# Patient Record
Sex: MALE | Race: OTHER | HISPANIC OR LATINO
[De-identification: names, ages, dates, MRNs, and addresses within clinical notes are randomized per-mention and may not be internally consistent; named-entity substitution may affect disease eponyms.]

---

## 2018-02-25 ENCOUNTER — TRANSCRIPTION ENCOUNTER (OUTPATIENT)
Age: 48
End: 2018-02-25

## 2019-06-10 ENCOUNTER — EMERGENCY (EMERGENCY)
Facility: HOSPITAL | Age: 49
LOS: 0 days | Discharge: HOME | End: 2019-06-10
Attending: EMERGENCY MEDICINE | Admitting: EMERGENCY MEDICINE
Payer: MEDICAID

## 2019-06-10 VITALS
TEMPERATURE: 99 F | RESPIRATION RATE: 18 BRPM | OXYGEN SATURATION: 99 % | SYSTOLIC BLOOD PRESSURE: 132 MMHG | HEART RATE: 75 BPM | DIASTOLIC BLOOD PRESSURE: 81 MMHG

## 2019-06-10 VITALS — WEIGHT: 179.9 LBS | HEIGHT: 66 IN

## 2019-06-10 DIAGNOSIS — R51 HEADACHE: ICD-10-CM

## 2019-06-10 DIAGNOSIS — E78.5 HYPERLIPIDEMIA, UNSPECIFIED: ICD-10-CM

## 2019-06-10 DIAGNOSIS — R42 DIZZINESS AND GIDDINESS: ICD-10-CM

## 2019-06-10 LAB
ALBUMIN SERPL ELPH-MCNC: 4 G/DL — SIGNIFICANT CHANGE UP (ref 3.5–5.2)
ALP SERPL-CCNC: 87 U/L — SIGNIFICANT CHANGE UP (ref 30–115)
ALT FLD-CCNC: 24 U/L — SIGNIFICANT CHANGE UP (ref 0–41)
ANION GAP SERPL CALC-SCNC: 12 MMOL/L — SIGNIFICANT CHANGE UP (ref 7–14)
AST SERPL-CCNC: 19 U/L — SIGNIFICANT CHANGE UP (ref 0–41)
BASOPHILS # BLD AUTO: 0.02 K/UL — SIGNIFICANT CHANGE UP (ref 0–0.2)
BASOPHILS NFR BLD AUTO: 0.4 % — SIGNIFICANT CHANGE UP (ref 0–1)
BILIRUB SERPL-MCNC: 0.3 MG/DL — SIGNIFICANT CHANGE UP (ref 0.2–1.2)
BUN SERPL-MCNC: 17 MG/DL — SIGNIFICANT CHANGE UP (ref 10–20)
CALCIUM SERPL-MCNC: 9.3 MG/DL — SIGNIFICANT CHANGE UP (ref 8.5–10.1)
CHLORIDE SERPL-SCNC: 103 MMOL/L — SIGNIFICANT CHANGE UP (ref 98–110)
CO2 SERPL-SCNC: 26 MMOL/L — SIGNIFICANT CHANGE UP (ref 17–32)
CREAT SERPL-MCNC: 1.1 MG/DL — SIGNIFICANT CHANGE UP (ref 0.7–1.5)
EOSINOPHIL # BLD AUTO: 0.06 K/UL — SIGNIFICANT CHANGE UP (ref 0–0.7)
EOSINOPHIL NFR BLD AUTO: 1.2 % — SIGNIFICANT CHANGE UP (ref 0–8)
GLUCOSE SERPL-MCNC: 108 MG/DL — HIGH (ref 70–99)
HCT VFR BLD CALC: 44.9 % — SIGNIFICANT CHANGE UP (ref 42–52)
HGB BLD-MCNC: 14.7 G/DL — SIGNIFICANT CHANGE UP (ref 14–18)
IMM GRANULOCYTES NFR BLD AUTO: 0.2 % — SIGNIFICANT CHANGE UP (ref 0.1–0.3)
LYMPHOCYTES # BLD AUTO: 1.77 K/UL — SIGNIFICANT CHANGE UP (ref 1.2–3.4)
LYMPHOCYTES # BLD AUTO: 34.3 % — SIGNIFICANT CHANGE UP (ref 20.5–51.1)
MAGNESIUM SERPL-MCNC: 1.9 MG/DL — SIGNIFICANT CHANGE UP (ref 1.8–2.4)
MCHC RBC-ENTMCNC: 29.9 PG — SIGNIFICANT CHANGE UP (ref 27–31)
MCHC RBC-ENTMCNC: 32.7 G/DL — SIGNIFICANT CHANGE UP (ref 32–37)
MCV RBC AUTO: 91.3 FL — SIGNIFICANT CHANGE UP (ref 80–94)
MONOCYTES # BLD AUTO: 0.41 K/UL — SIGNIFICANT CHANGE UP (ref 0.1–0.6)
MONOCYTES NFR BLD AUTO: 7.9 % — SIGNIFICANT CHANGE UP (ref 1.7–9.3)
NEUTROPHILS # BLD AUTO: 2.89 K/UL — SIGNIFICANT CHANGE UP (ref 1.4–6.5)
NEUTROPHILS NFR BLD AUTO: 56 % — SIGNIFICANT CHANGE UP (ref 42.2–75.2)
NRBC # BLD: 0 /100 WBCS — SIGNIFICANT CHANGE UP (ref 0–0)
PLATELET # BLD AUTO: 181 K/UL — SIGNIFICANT CHANGE UP (ref 130–400)
POTASSIUM SERPL-MCNC: 4.2 MMOL/L — SIGNIFICANT CHANGE UP (ref 3.5–5)
POTASSIUM SERPL-SCNC: 4.2 MMOL/L — SIGNIFICANT CHANGE UP (ref 3.5–5)
PROT SERPL-MCNC: 6.8 G/DL — SIGNIFICANT CHANGE UP (ref 6–8)
RBC # BLD: 4.92 M/UL — SIGNIFICANT CHANGE UP (ref 4.7–6.1)
RBC # FLD: 12.9 % — SIGNIFICANT CHANGE UP (ref 11.5–14.5)
SODIUM SERPL-SCNC: 141 MMOL/L — SIGNIFICANT CHANGE UP (ref 135–146)
TROPONIN T SERPL-MCNC: <0.01 NG/ML — SIGNIFICANT CHANGE UP
WBC # BLD: 5.16 K/UL — SIGNIFICANT CHANGE UP (ref 4.8–10.8)
WBC # FLD AUTO: 5.16 K/UL — SIGNIFICANT CHANGE UP (ref 4.8–10.8)

## 2019-06-10 PROCEDURE — 93010 ELECTROCARDIOGRAM REPORT: CPT

## 2019-06-10 PROCEDURE — 99285 EMERGENCY DEPT VISIT HI MDM: CPT

## 2019-06-10 PROCEDURE — 70450 CT HEAD/BRAIN W/O DYE: CPT | Mod: 26

## 2019-06-10 PROCEDURE — 71045 X-RAY EXAM CHEST 1 VIEW: CPT | Mod: 26

## 2019-06-10 RX ORDER — MECLIZINE HCL 12.5 MG
50 TABLET ORAL ONCE
Refills: 0 | Status: COMPLETED | OUTPATIENT
Start: 2019-06-10 | End: 2019-06-10

## 2019-06-10 RX ORDER — SODIUM CHLORIDE 9 MG/ML
1000 INJECTION, SOLUTION INTRAVENOUS ONCE
Refills: 0 | Status: COMPLETED | OUTPATIENT
Start: 2019-06-10 | End: 2019-06-10

## 2019-06-10 RX ORDER — MECLIZINE HCL 12.5 MG
1 TABLET ORAL
Qty: 21 | Refills: 0
Start: 2019-06-10 | End: 2019-06-16

## 2019-06-10 RX ADMIN — Medication 50 MILLIGRAM(S): at 18:44

## 2019-06-10 RX ADMIN — SODIUM CHLORIDE 1000 MILLILITER(S): 9 INJECTION, SOLUTION INTRAVENOUS at 18:44

## 2019-06-10 NOTE — ED ADULT NURSE NOTE - NSIMPLEMENTINTERV_GEN_ALL_ED
Implemented All Universal Safety Interventions:  [place] to call system. Call bell, personal items and telephone within reach. Instruct patient to call for assistance. Room bathroom lighting operational. Non-slip footwear when patient is off stretcher. Physically safe environment: no spills, clutter or unnecessary equipment. Stretcher in lowest position, wheels locked, appropriate side rails in place.

## 2019-06-10 NOTE — ED PROVIDER NOTE - NSFOLLOWUPCLINICS_GEN_ALL_ED_FT
[facility]  Neurology  [address] [place] [address], [facility] [address]  [place] [address]  Phone: [phone]  Fax:   Follow Up Time:

## 2019-06-10 NOTE — ED PROVIDER NOTE - CLINICAL SUMMARY MEDICAL DECISION MAKING FREE TEXT BOX
Patient presented with intermittent dizziness and headache x 1 week. Fully neuro intact, HD stable, afebrile, well appearing. Obtained labs which were grossly unremarkable. Unlikely cardiac etiology based on history but EKG non-ischemic, trop negative (1 week of symptoms). CT head negative as well. After tx in ED with meclizine patient felt better, ambulatory, tolerates PO. Will give neuro outpatient follow up and patient agrees to call for appointment. Agrees to return to ED for any new or worsening symptoms.

## 2019-06-10 NOTE — ED PROVIDER NOTE - PROGRESS NOTE DETAILS
Patient seen and evaluated by me. Labs/imaging ordered. Fully neuro intact. Started on 1L IVF, given 50mg meclizine PO. Will re-eval pending work up. Labs/imaging back and results discussed with patient. Feeling better. Agrees to follow up with neurology as provided in DC instructions. Ambulatory, tolerates PO.

## 2019-06-10 NOTE — ED PROVIDER NOTE - OBJECTIVE STATEMENT
48 year old male, pmhx HLD, presenting with a 1 week history of worsening dizziness described as a mix between lightheadedness and vertigo, as well as intermittent headaches as well. Headaches are diffuse, non-radiating, no palliative or provocative factors, intermittent, 4/10 at their worst. States he has never had this before. Currently does not have a headache. Otherwise denies fevers, vision changes, weakness/numbness, confusion, URI symptoms, neck pain, chest pain, back pain, dyspnea, cough, palpitations, nausea, vomiting, abdominal pain, diarrhea, constipation, blood in stool/dark stools, urinary symptoms, penile discharge/testicular pain, leg swelling, rash, recent travel or sick contacts.

## 2020-12-18 ENCOUNTER — OUTPATIENT (OUTPATIENT)
Dept: OUTPATIENT SERVICES | Facility: HOSPITAL | Age: 50
LOS: 1 days | Discharge: HOME | End: 2020-12-18

## 2020-12-18 DIAGNOSIS — Z11.59 ENCOUNTER FOR SCREENING FOR OTHER VIRAL DISEASES: ICD-10-CM

## 2020-12-18 PROBLEM — E78.5 HYPERLIPIDEMIA, UNSPECIFIED: Chronic | Status: ACTIVE | Noted: 2019-06-10

## 2020-12-21 ENCOUNTER — OUTPATIENT (OUTPATIENT)
Dept: OUTPATIENT SERVICES | Facility: HOSPITAL | Age: 50
LOS: 1 days | Discharge: HOME | End: 2020-12-21
Payer: MEDICARE

## 2020-12-21 DIAGNOSIS — R07.9 CHEST PAIN, UNSPECIFIED: ICD-10-CM

## 2020-12-21 PROCEDURE — 93306 TTE W/DOPPLER COMPLETE: CPT | Mod: 26

## 2022-02-24 ENCOUNTER — EMERGENCY (EMERGENCY)
Facility: HOSPITAL | Age: 52
LOS: 0 days | Discharge: HOME | End: 2022-02-25
Attending: EMERGENCY MEDICINE | Admitting: EMERGENCY MEDICINE
Payer: MEDICAID

## 2022-02-24 VITALS
OXYGEN SATURATION: 97 % | TEMPERATURE: 98 F | RESPIRATION RATE: 20 BRPM | HEIGHT: 66 IN | HEART RATE: 94 BPM | SYSTOLIC BLOOD PRESSURE: 122 MMHG | DIASTOLIC BLOOD PRESSURE: 58 MMHG

## 2022-02-24 DIAGNOSIS — R07.9 CHEST PAIN, UNSPECIFIED: ICD-10-CM

## 2022-02-24 DIAGNOSIS — E78.5 HYPERLIPIDEMIA, UNSPECIFIED: ICD-10-CM

## 2022-02-24 DIAGNOSIS — K27.9 PEPTIC ULCER, SITE UNSPECIFIED, UNSPECIFIED AS ACUTE OR CHRONIC, WITHOUT HEMORRHAGE OR PERFORATION: ICD-10-CM

## 2022-02-24 DIAGNOSIS — R07.2 PRECORDIAL PAIN: ICD-10-CM

## 2022-02-24 DIAGNOSIS — R07.89 OTHER CHEST PAIN: ICD-10-CM

## 2022-02-24 DIAGNOSIS — Z20.822 CONTACT WITH AND (SUSPECTED) EXPOSURE TO COVID-19: ICD-10-CM

## 2022-02-24 LAB
ALBUMIN SERPL ELPH-MCNC: 4.2 G/DL — SIGNIFICANT CHANGE UP (ref 3.5–5.2)
ALP SERPL-CCNC: 64 U/L — SIGNIFICANT CHANGE UP (ref 30–115)
ALT FLD-CCNC: 30 U/L — SIGNIFICANT CHANGE UP (ref 0–41)
ANION GAP SERPL CALC-SCNC: 9 MMOL/L — SIGNIFICANT CHANGE UP (ref 7–14)
AST SERPL-CCNC: 23 U/L — SIGNIFICANT CHANGE UP (ref 0–41)
BASOPHILS # BLD AUTO: 0.05 K/UL — SIGNIFICANT CHANGE UP (ref 0–0.2)
BASOPHILS NFR BLD AUTO: 1.1 % — HIGH (ref 0–1)
BILIRUB SERPL-MCNC: 0.6 MG/DL — SIGNIFICANT CHANGE UP (ref 0.2–1.2)
BUN SERPL-MCNC: 19 MG/DL — SIGNIFICANT CHANGE UP (ref 10–20)
CALCIUM SERPL-MCNC: 9 MG/DL — SIGNIFICANT CHANGE UP (ref 8.5–10.1)
CHLORIDE SERPL-SCNC: 103 MMOL/L — SIGNIFICANT CHANGE UP (ref 98–110)
CO2 SERPL-SCNC: 26 MMOL/L — SIGNIFICANT CHANGE UP (ref 17–32)
CREAT SERPL-MCNC: 1.2 MG/DL — SIGNIFICANT CHANGE UP (ref 0.7–1.5)
EOSINOPHIL # BLD AUTO: 0.08 K/UL — SIGNIFICANT CHANGE UP (ref 0–0.7)
EOSINOPHIL NFR BLD AUTO: 1.7 % — SIGNIFICANT CHANGE UP (ref 0–8)
GLUCOSE SERPL-MCNC: 102 MG/DL — HIGH (ref 70–99)
HCT VFR BLD CALC: 44.1 % — SIGNIFICANT CHANGE UP (ref 42–52)
HGB BLD-MCNC: 14.8 G/DL — SIGNIFICANT CHANGE UP (ref 14–18)
IMM GRANULOCYTES NFR BLD AUTO: 0.2 % — SIGNIFICANT CHANGE UP (ref 0.1–0.3)
LYMPHOCYTES # BLD AUTO: 1.75 K/UL — SIGNIFICANT CHANGE UP (ref 1.2–3.4)
LYMPHOCYTES # BLD AUTO: 37.1 % — SIGNIFICANT CHANGE UP (ref 20.5–51.1)
MCHC RBC-ENTMCNC: 29.7 PG — SIGNIFICANT CHANGE UP (ref 27–31)
MCHC RBC-ENTMCNC: 33.6 G/DL — SIGNIFICANT CHANGE UP (ref 32–37)
MCV RBC AUTO: 88.6 FL — SIGNIFICANT CHANGE UP (ref 80–94)
MONOCYTES # BLD AUTO: 0.39 K/UL — SIGNIFICANT CHANGE UP (ref 0.1–0.6)
MONOCYTES NFR BLD AUTO: 8.3 % — SIGNIFICANT CHANGE UP (ref 1.7–9.3)
NEUTROPHILS # BLD AUTO: 2.44 K/UL — SIGNIFICANT CHANGE UP (ref 1.4–6.5)
NEUTROPHILS NFR BLD AUTO: 51.6 % — SIGNIFICANT CHANGE UP (ref 42.2–75.2)
NRBC # BLD: 0 /100 WBCS — SIGNIFICANT CHANGE UP (ref 0–0)
NT-PROBNP SERPL-SCNC: 38 PG/ML — SIGNIFICANT CHANGE UP (ref 0–300)
PLATELET # BLD AUTO: 167 K/UL — SIGNIFICANT CHANGE UP (ref 130–400)
POTASSIUM SERPL-MCNC: 4.1 MMOL/L — SIGNIFICANT CHANGE UP (ref 3.5–5)
POTASSIUM SERPL-SCNC: 4.1 MMOL/L — SIGNIFICANT CHANGE UP (ref 3.5–5)
PROT SERPL-MCNC: 6.6 G/DL — SIGNIFICANT CHANGE UP (ref 6–8)
RBC # BLD: 4.98 M/UL — SIGNIFICANT CHANGE UP (ref 4.7–6.1)
RBC # FLD: 12.2 % — SIGNIFICANT CHANGE UP (ref 11.5–14.5)
SODIUM SERPL-SCNC: 138 MMOL/L — SIGNIFICANT CHANGE UP (ref 135–146)
TROPONIN T SERPL-MCNC: <0.01 NG/ML — SIGNIFICANT CHANGE UP
TROPONIN T SERPL-MCNC: <0.01 NG/ML — SIGNIFICANT CHANGE UP
WBC # BLD: 4.72 K/UL — LOW (ref 4.8–10.8)
WBC # FLD AUTO: 4.72 K/UL — LOW (ref 4.8–10.8)

## 2022-02-24 PROCEDURE — 71045 X-RAY EXAM CHEST 1 VIEW: CPT | Mod: 26

## 2022-02-24 PROCEDURE — 99218: CPT

## 2022-02-24 RX ORDER — FAMOTIDINE 10 MG/ML
20 INJECTION INTRAVENOUS ONCE
Refills: 0 | Status: COMPLETED | OUTPATIENT
Start: 2022-02-24 | End: 2022-02-24

## 2022-02-24 RX ORDER — FUROSEMIDE 40 MG
40 TABLET ORAL ONCE
Refills: 0 | Status: COMPLETED | OUTPATIENT
Start: 2022-02-24 | End: 2022-02-24

## 2022-02-24 RX ORDER — ASPIRIN/CALCIUM CARB/MAGNESIUM 324 MG
162 TABLET ORAL ONCE
Refills: 0 | Status: COMPLETED | OUTPATIENT
Start: 2022-02-24 | End: 2022-02-24

## 2022-02-24 RX ADMIN — Medication 162 MILLIGRAM(S): at 20:12

## 2022-02-24 RX ADMIN — FAMOTIDINE 20 MILLIGRAM(S): 10 INJECTION INTRAVENOUS at 22:27

## 2022-02-24 NOTE — ED CDU PROVIDER INITIAL DAY NOTE - MEDICAL DECISION MAKING DETAILS
51-year-old male was placed in observation for evaluation of chest pain.  Patient had negative troponin x2.  EKG did not demonstrate acute ischemia.  Chest x-ray normal.  Patient pending CCTA.

## 2022-02-24 NOTE — ED PROVIDER NOTE - OBJECTIVE STATEMENT
51y M pmh HLD, PUD presenting with substernal chest pain xfew days. Described as chest pressure. Comes and goes. No f/c/n/v. Nonsmoker. No difficulty breathing. Pt supposed to be on pantoprazole but says he self d/shanda it last week as it was causing headaches and diarrhea. No leg swelling. Has never seen a cardiologist or had a workup. No radiation of pain, but pain is severe. No trauma/overlying skin lesions.

## 2022-02-24 NOTE — ED CDU PROVIDER INITIAL DAY NOTE - ATTENDING CONTRIBUTION TO CARE
51-year-old male with past medical history of hyperlipidemia was placed in observation for chest pain.  No shortness of breath no palpitations.  Patient states he is doing well since placed observation.  No acute events.    Const: Well nourished, well developed, appears stated age  Eyes: PERRL, no conjunctival injection  HENT:  Neck supple without meningismus   CV: RRR, Warm, well-perfused extremities  RESP: CTA B/L, no tachypnea   GI: soft, non-tender, non-distended  MSK: No gross deformities appreciated  Skin: Warm, dry. No rashes  Neuro: Alert, CNs II-XII grossly intact. Sensation and motor function of extremities grossly intact.  Psych: Appropriate mood and affect.    pending CCTA

## 2022-02-24 NOTE — ED PROVIDER NOTE - ATTENDING CONTRIBUTION TO CARE
I personally evaluated the patient. I reviewed the Resident’s or Physician Assistant’s note (as assigned above), and agree with the findings and plan except as documented in my note.  Pt with hx of HTN presents with 1 day of non radiating left sided chest pressure. No associated SOB, palpitations, lightheadedness, LOC. VS reviewed, pt non-toxic appearing, NAD. Head ncat, MMM, neck supple, normal ROM, normal s1s2 without any murmurs, Lungs CTAB with normal work of breathing. abd +BS, s/nd/nt, extremities wnl, neuro exam grossly normal. No acute skin rashes. Plan is ekg, labs, monitor, imaging and dispo accordingly.

## 2022-02-25 VITALS
HEART RATE: 62 BPM | OXYGEN SATURATION: 98 % | SYSTOLIC BLOOD PRESSURE: 112 MMHG | DIASTOLIC BLOOD PRESSURE: 64 MMHG | RESPIRATION RATE: 18 BRPM

## 2022-02-25 LAB — SARS-COV-2 RNA SPEC QL NAA+PROBE: SIGNIFICANT CHANGE UP

## 2022-02-25 PROCEDURE — 75574 CT ANGIO HRT W/3D IMAGE: CPT | Mod: 26,MA

## 2022-02-25 PROCEDURE — 99217: CPT

## 2022-02-25 PROCEDURE — 93010 ELECTROCARDIOGRAM REPORT: CPT

## 2022-02-25 RX ORDER — METOPROLOL TARTRATE 50 MG
100 TABLET ORAL ONCE
Refills: 0 | Status: COMPLETED | OUTPATIENT
Start: 2022-02-25 | End: 2022-02-25

## 2022-02-25 RX ORDER — KETOROLAC TROMETHAMINE 30 MG/ML
15 SYRINGE (ML) INJECTION ONCE
Refills: 0 | Status: DISCONTINUED | OUTPATIENT
Start: 2022-02-25 | End: 2022-02-25

## 2022-02-25 RX ADMIN — Medication 100 MILLIGRAM(S): at 08:35

## 2022-02-25 RX ADMIN — Medication 15 MILLIGRAM(S): at 12:00

## 2022-02-25 NOTE — ED CDU PROVIDER DISPOSITION NOTE - ATTENDING CONTRIBUTION TO CARE
51-year-old male was placed in observation for evaluation of chest pain.  Patient currently chest pain-free well-appearing.    Const: Well nourished, well developed, appears stated age  Eyes: PERRL, no conjunctival injection  HENT:  Neck supple without meningismus   CV: RRR, Warm, well-perfused extremities  RESP: CTA B/L, no tachypnea   GI: soft, non-tender, non-distended  MSK: No gross deformities appreciated  Skin: Warm, dry. No rashes  Neuro: Alert, CNs II-XII grossly intact. Sensation and motor function of extremities grossly intact.  Psych: Appropriate mood and affect.

## 2022-02-25 NOTE — ED CDU PROVIDER SUBSEQUENT DAY NOTE - ATTENDING CONTRIBUTION TO CARE
51-year-old male was placed in observation for evaluation of chest pain.  Patient had negative troponin x2.  Patient states is not having active chest pain.  No shortness of breath no palpitations.    Const: Well nourished, well developed, appears stated age  Eyes: PERRL, no conjunctival injection  HENT:  Neck supple without meningismus   CV: RRR, Warm, well-perfused extremities  RESP: CTA B/L, no tachypnea   GI: soft, non-tender, non-distended  MSK: No gross deformities appreciated  Skin: Warm, dry. No rashes  Neuro: Alert, CNs II-XII grossly intact. Sensation and motor function of extremities grossly intact.  Psych: Appropriate mood and affect.    pending CCTA

## 2022-02-25 NOTE — ED CDU PROVIDER SUBSEQUENT DAY NOTE - HISTORY
received signout from Dr. Corby Khan pt in obs for evaluation of chest pain; initial ce/ekg unremarkable; 2nd ce/ekg ordered; hr currently 64 bp 106/59; will plan for ccta in am

## 2022-02-25 NOTE — ED CDU PROVIDER DISPOSITION NOTE - CLINICAL COURSE
51-year-old male was placed in observation for evaluation of chest pain.  Patient had troponin which was negative x2.  EKG unchanged.  Chest x-ray normal.  Patient had CCTA which demonstrated a CADS RADS score of 0.  Patient was found to have a 3 mm nodule in the left lower lobe and recommended follow-up with CT scan within 12months. Discussed this with patient.

## 2022-02-25 NOTE — ED ADULT NURSE REASSESSMENT NOTE - NS ED NURSE REASSESS COMMENT FT1
pt is awake, A&Ox4, even and unlabored breathing. cardiac monitoring in progress, awaiting CCTA
pt alert and oriented x4. pt received on cardiac monitoring at this time. pt denies any chest pain or SOB. pt updated on plan. no acute distress noted at this time. will continue to monitor

## 2022-02-25 NOTE — ED CDU PROVIDER DISPOSITION NOTE - PATIENT PORTAL LINK FT
You can access the FollowMyHealth Patient Portal offered by [facility] by registering at the following website: [email or web address]. By joining [employer]’s FollowMyHealth portal, you will also be able to view your health information using other applications (apps) compatible with our system.

## 2022-07-07 ENCOUNTER — EMERGENCY (EMERGENCY)
Facility: HOSPITAL | Age: 52
LOS: 0 days | Discharge: HOME | End: 2022-07-07
Attending: EMERGENCY MEDICINE | Admitting: EMERGENCY MEDICINE

## 2022-07-07 VITALS
WEIGHT: 175.05 LBS | RESPIRATION RATE: 20 BRPM | SYSTOLIC BLOOD PRESSURE: 126 MMHG | HEART RATE: 63 BPM | TEMPERATURE: 98 F | HEIGHT: 66 IN | DIASTOLIC BLOOD PRESSURE: 67 MMHG | OXYGEN SATURATION: 99 %

## 2022-07-07 VITALS
SYSTOLIC BLOOD PRESSURE: 146 MMHG | DIASTOLIC BLOOD PRESSURE: 75 MMHG | HEART RATE: 62 BPM | OXYGEN SATURATION: 99 % | TEMPERATURE: 96 F

## 2022-07-07 DIAGNOSIS — M62.838 OTHER MUSCLE SPASM: ICD-10-CM

## 2022-07-07 DIAGNOSIS — E78.5 HYPERLIPIDEMIA, UNSPECIFIED: ICD-10-CM

## 2022-07-07 DIAGNOSIS — M25.512 PAIN IN LEFT SHOULDER: ICD-10-CM

## 2022-07-07 DIAGNOSIS — G44.209 TENSION-TYPE HEADACHE, UNSPECIFIED, NOT INTRACTABLE: ICD-10-CM

## 2022-07-07 DIAGNOSIS — R51.9 HEADACHE, UNSPECIFIED: ICD-10-CM

## 2022-07-07 DIAGNOSIS — M54.2 CERVICALGIA: ICD-10-CM

## 2022-07-07 PROCEDURE — 99284 EMERGENCY DEPT VISIT MOD MDM: CPT

## 2022-07-07 PROCEDURE — 70450 CT HEAD/BRAIN W/O DYE: CPT | Mod: 26,MA

## 2022-07-07 RX ORDER — KETOROLAC TROMETHAMINE 30 MG/ML
60 SYRINGE (ML) INJECTION ONCE
Refills: 0 | Status: DISCONTINUED | OUTPATIENT
Start: 2022-07-07 | End: 2022-07-07

## 2022-07-07 RX ORDER — METHOCARBAMOL 500 MG/1
1000 TABLET, FILM COATED ORAL ONCE
Refills: 0 | Status: COMPLETED | OUTPATIENT
Start: 2022-07-07 | End: 2022-07-07

## 2022-07-07 RX ORDER — KETOROLAC TROMETHAMINE 30 MG/ML
15 SYRINGE (ML) INJECTION ONCE
Refills: 0 | Status: DISCONTINUED | OUTPATIENT
Start: 2022-07-07 | End: 2022-07-07

## 2022-07-07 RX ORDER — METHOCARBAMOL 500 MG/1
4 TABLET, FILM COATED ORAL
Qty: 60 | Refills: 0
Start: 2022-07-07 | End: 2022-07-11

## 2022-07-07 RX ORDER — METOCLOPRAMIDE HCL 10 MG
10 TABLET ORAL ONCE
Refills: 0 | Status: COMPLETED | OUTPATIENT
Start: 2022-07-07 | End: 2022-07-07

## 2022-07-07 RX ORDER — ACETAMINOPHEN 500 MG
975 TABLET ORAL ONCE
Refills: 0 | Status: COMPLETED | OUTPATIENT
Start: 2022-07-07 | End: 2022-07-07

## 2022-07-07 RX ADMIN — Medication 10 MILLIGRAM(S): at 13:44

## 2022-07-07 RX ADMIN — Medication 975 MILLIGRAM(S): at 14:15

## 2022-07-07 RX ADMIN — METHOCARBAMOL 1000 MILLIGRAM(S): 500 TABLET, FILM COATED ORAL at 13:45

## 2022-07-07 RX ADMIN — Medication 975 MILLIGRAM(S): at 13:45

## 2022-07-07 RX ADMIN — Medication 60 MILLIGRAM(S): at 13:43

## 2022-07-07 RX ADMIN — Medication 60 MILLIGRAM(S): at 14:15

## 2022-07-07 NOTE — ED PROVIDER NOTE - NS ED ATTENDING STATEMENT MOD
This was a shared visit with the BECKY. I reviewed and verified the documentation and independently performed the documented: Attending with

## 2022-07-07 NOTE — ED PROVIDER NOTE - PHYSICAL EXAMINATION
CONSTITUTIONAL: Well-appearing; well-nourished; in no apparent distress.   HEAD: Normocephalic; atraumatic.   EYES: PERRL; EOM intact. Conjunctiva normal B/L.   ENT: Normal pharynx with no tonsillar hypertrophy. MMM.  NECK: Supple; non-tender; no cervical lymphadenopathy. tense paraspinal muscles  CHEST: Normal chest excursion with respiration.   CARDIOVASCULAR: Normal S1, S2; no murmurs, rubs, or gallops.   RESPIRATORY: Normal chest excursion with respiration; breath sounds clear and equal bilaterally; no wheezes, rhonchi, or rales.  GI/: Normal bowel sounds; non-distended; non-tender.  BACK: No evidence of trauma or deformity. Non-tender to palpation. No CVA tenderness. no spinal tenderness  EXT: Normal ROM in all four extremities; non-tender to palpation; distal pulses are normal. No leg edema B/L.   SKIN: Normal for age and race; warm; dry; good turgor.  NEURO: A & O x 4; CN 2-12 intact. Grossly unremarkable.

## 2022-07-07 NOTE — ED PROVIDER NOTE - PATIENT PORTAL LINK FT
You can access the FollowMyHealth Patient Portal offered by [facility] by registering at the following website: [email or web address]. By joining [employer]’s FollowMyHealth portal, you will also be able to view your health information using other applications (apps) compatible with our system.

## 2022-07-07 NOTE — ED PROVIDER NOTE - ATTENDING CONTRIBUTION TO CARE
53 yo m with pmh of hld, presents with c/o monthly headaches, worse in the last 2 weeks.  had head trauma 1 month ago, hit his head on an overhang at home with small scalp injury, did not seek med care.  denies focal numbness, weakness, facial droop, slurred speech, vision changes, dizziness.  pt c/o L shoulder pain radiating to posterior head.  works at a restaurant.  exam: nad, ncat, perrl, eomi, mmm, rrr, ctab, abd soft, nt, nd, ttp L trap and L paraspinal muscle in c-spine, no midline ttp, aox3, cn2-12 normal, 5/5 strength all ext, sensation intact, finger to nose normal, romberg neg, prontator drift neg, gait normal imp: pt with headaches for a few months, worse 2 weeks, reassuring neuro exam, L trap spasm, symptomatic tx for MSK shoulder pain, and headache

## 2022-07-07 NOTE — ED PROVIDER NOTE - OBJECTIVE STATEMENT
52 y M no PMHx c/o headache x2 weeks. pt states that 1 month ago he hit his head on a pole at work causing a laceration to the top of his head. pt states that he did not have any symptoms until 2 weeks ago where he is having daily headaches refractory to tylenol and motrin. pt states that he feels pain from his L arm to his neck and to his head that becomes diffuse pain. pt denies n/v/f/sob/cp/aura/ d.

## 2022-07-07 NOTE — ED PROVIDER NOTE - CARE PROVIDER_API CALL
Bradford Castaneda)  Neurology  [address] [place] [address], [address]  [place] [address]  Phone: [phone]  Fax: [phone]  Follow Up Time: Routine

## 2024-06-20 ENCOUNTER — EMERGENCY (EMERGENCY)
Facility: HOSPITAL | Age: 54
LOS: 0 days | Discharge: HOME | End: 2024-06-21
Attending: EMERGENCY MEDICINE
Payer: COMMERCIAL

## 2024-06-20 VITALS
SYSTOLIC BLOOD PRESSURE: 109 MMHG | HEART RATE: 84 BPM | DIASTOLIC BLOOD PRESSURE: 69 MMHG | TEMPERATURE: 98 F | RESPIRATION RATE: 18 BRPM | OXYGEN SATURATION: 96 % | WEIGHT: 179.9 LBS

## 2024-06-20 DIAGNOSIS — S70.02XA CONTUSION OF LEFT HIP, INITIAL ENCOUNTER: ICD-10-CM

## 2024-06-20 DIAGNOSIS — F17.200 NICOTINE DEPENDENCE, UNSPECIFIED, UNCOMPLICATED: ICD-10-CM

## 2024-06-20 DIAGNOSIS — S92.404A NONDISPLACED UNSPECIFIED FRACTURE OF RIGHT GREAT TOE, INITIAL ENCOUNTER FOR CLOSED FRACTURE: ICD-10-CM
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
## 2024-06-20 DIAGNOSIS — M25.571 PAIN IN RIGHT ANKLE AND JOINTS OF RIGHT FOOT: ICD-10-CM

## 2024-06-20 DIAGNOSIS — M25.552 PAIN IN LEFT HIP: ICD-10-CM
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
## 2024-06-20 PROCEDURE — 73610 X-RAY EXAM OF ANKLE: CPT | Mod: RT

## 2024-06-20 PROCEDURE — 73502 X-RAY EXAM HIP UNI 2-3 VIEWS: CPT | Mod: LT

## 2024-06-20 PROCEDURE — 73502 X-RAY EXAM HIP UNI 2-3 VIEWS: CPT | Mod: 26,LT

## 2024-06-20 PROCEDURE — 73620 X-RAY EXAM OF FOOT: CPT | Mod: 26,RT

## 2024-06-20 PROCEDURE — 28490 TREAT BIG TOE FRACTURE: CPT | Mod: 54,RT

## 2024-06-20 PROCEDURE — 73620 X-RAY EXAM OF FOOT: CPT | Mod: RT

## 2024-06-20 PROCEDURE — 73610 X-RAY EXAM OF ANKLE: CPT | Mod: 26,RT

## 2024-06-20 PROCEDURE — 99284 EMERGENCY DEPT VISIT MOD MDM: CPT | Mod: 25

## 2024-06-20 PROCEDURE — 99284 EMERGENCY DEPT VISIT MOD MDM: CPT | Mod: 57

## 2024-06-20 PROCEDURE — 29515 APPLICATION SHORT LEG SPLINT: CPT | Mod: RT

## 2024-06-20 RX ORDER — ACETAMINOPHEN 500 MG
650 TABLET ORAL ONCE
Refills: 0 | Status: COMPLETED | OUTPATIENT
Start: 2024-06-20 | End: 2024-06-20

## 2024-06-20 RX ORDER — IBUPROFEN 200 MG
600 TABLET ORAL ONCE
Refills: 0 | Status: COMPLETED | OUTPATIENT
Start: 2024-06-20 | End: 2024-06-20

## 2024-06-20 RX ADMIN — Medication 650 MILLIGRAM(S): at 23:09

## 2024-06-20 RX ADMIN — Medication 600 MILLIGRAM(S): at 23:09

## 2024-06-20 NOTE — ED PROVIDER NOTE - CLINICAL SUMMARY MEDICAL DECISION MAKING FREE TEXT BOX
53-year-old male presents to the ED status post fall from the ladder.  Denies any head injury or LOC.  Patient reports pain to the left hip right ankle and right foot.  Physical exam noted to have a left hip hematoma with pain with range of motion of the left hip.  We obtained x-rays of the hip, ankle and foot.  Foot revealed a fracture of the first toe.  Remainder the x-rays unremarkable.  Given Tylenol Motrin for pain.  Splinted right foot.  Given follow-up with orthopedics.  Discharged home.  Crutch training given.

## 2024-06-20 NOTE — ED PROVIDER NOTE - WR INTERPRETATION 1
Independent interpretation of the right foot X-Ray film(s) performed by Efrain Blanco noted to have first digit fracture nondisplaced

## 2024-06-20 NOTE — ED PROVIDER NOTE - OBJECTIVE STATEMENT
53 yold male to Ed Pmhx Hld s/p fall approx 10 feet on ladder on left side ; pt c/o pain to left hip/buttock area and right ankle, foot and toes; pt denies hitting head; pt denies neck, chest, abdominal pain; pt ambulatory at scene; pt denies feeling dizzy, weak, lightheaded, dizzy;

## 2024-06-20 NOTE — ED PROVIDER NOTE - WR INTERPRETATION 2
Independent interpretation of the right ankle X-Ray film(s) performed by Efrain Blanco are negative for Fractures, dislocations, or subluxations.

## 2024-06-20 NOTE — ED PROVIDER NOTE - PHYSICAL EXAMINATION
Constitutional: Well developed, well nourished. NAD  Head: Normocephalic, atraumatic.  Eyes: PERRL, EOMI.  ENT: No nasal discharge. Mucous membranes dry.  Neck: Supple. Painless ROM.  Cardiovascular:  Regular rate and rhythm.    Pulmonary:  Lungs clear to auscultation bilaterally.    Abdominal: Soft. Nondistended. No rebound, guarding, rigidity.  Extremities. Pelvis stable. Left hip swelling/abrasion to posterior hip/buttock area; right ankle + swelling and tenderness noted to medial maleolus and left 1st toe;   Skin: No rashes, cyanosis.  Neuro: AAOx3. No focal neurological deficits.  Psych: Normal mood. Normal affect.

## 2024-06-20 NOTE — ED PROVIDER NOTE - CARE PLAN
1 Principal Discharge DX:	Fracture of toe of right foot  Secondary Diagnosis:	Right ankle sprain  Secondary Diagnosis:	Contusion of left hip

## 2024-06-20 NOTE — ED ADULT TRIAGE NOTE - CHIEF COMPLAINT QUOTE
pt was climbing up a ladder (10-12 ft) and ladder fell over and patient fell with it , pt complains of right foot and lift hip pain, denies head injury, loc, blood thinners

## 2024-06-20 NOTE — ED PROVIDER NOTE - NSFOLLOWUPINSTRUCTIONS_ED_ALL_ED_FT
Our Emergency Department Referral Coordinators will be reaching out to you in the next 24-48 hours from 9:00am to to schedule a follow up appointment. Please expect a phone call from the hospital in that time frame. If you do not receive a call or if you have any questions or concerns, you can reach them at [phone].     Foot Fracture  WHAT YOU NEED TO KNOW:    A foot fracture is a break in one or more of the bones in your foot. Foot fractures are commonly caused by trauma, falls, or repeated stress injuries. Foot Anatomy    DISCHARGE INSTRUCTIONS:    Medicines:   Antibiotics: This medicine is given to help treat or prevent an infection caused by bacteria.   NSAIDs: These medicines decrease swelling and pain. NSAIDs are available without a doctor's order. Ask which medicine is right for you. Ask how much to take and when to take it. Take as directed. NSAIDs can cause stomach bleeding and kidney problems if not taken correctly.  Pain medicine: You may be given a prescription medicine to decrease pain. Do not wait until the pain is severe before you take this medicine.    Take your medicine as directed. Contact your healthcare provider if you think your medicine is not helping or if you have side effects. Tell him of her if you are allergic to any medicine. Keep a list of the medicines, vitamins, and herbs you take. Include the amounts, and when and why you take them. Bring the list or the pill bottles to follow-up visits. Carry your medicine list with you in case of an emergency.    Follow up with your healthcare provider or bone specialist as directed: You may need to return to have your splint or stitches removed. You may also need to return for tests to make sure your foot is healing. Write down your questions so you remember to ask them during your visits.    Wound care: Carefully wash the wound with soap and water. Dry the area and put on new, clean bandages as directed. Change your bandages when they get wet or dirty.    Self-care:     Rest: You may need to rest your foot and avoid activities that cause pain. For stress fractures, you will need to avoid the activity that caused the fracture until it heals. Ask when you can return to your normal activities such as work and sports.    Ice: Ice helps decrease swelling and pain. Ice may also help prevent tissue damage. Use an ice pack or put crushed ice in a plastic bag. Cover it with a towel, and place it on your foot for 15 to 20 minutes every hour as directed.    Elevate your foot: Raise your foot at or above the level of your heart as often as you can. This will help decrease swelling and pain. Prop your foot on pillows or blankets to keep it elevated comfortably.     Physical therapy: Once your foot has healed, a physical therapist can teach you exercises to help improve movement and strength, and to decrease pain.    Splint care:     Check the skin around your splint daily for any redness or open areas.  Do not use a sharp or pointed object to scratch your skin under the splint.  Do not remove your splint unless your healthcare provider or orthopedic surgeon says it is okay.  Bathing with a splint: Do not let your splint get wet. Before bathing, cover the splint with a plastic bag. Tape the bag to your skin above the splint to seal out the water. Keep your foot out of the water in case the bag leaks. Ask when it is okay to take a bath or shower.  Assistive devices: You may be given a hard-soled shoe to wear while your foot is healing. You also may need to use crutches to help you walk while your foot heals. It is important to use your crutches correctly. Ask for more information about how to use crutches.    Contact your healthcare provider or bone specialist if:     You have a fever.  You have new sores around your boot or splint.  You have new or worsening trouble moving your foot.  You notice a foul smell coming from under your splint.  Your boot or splint gets damaged.  You have questions or concerns about your condition or care.    Return to the emergency department if:     The pain in your injured foot gets worse even after you rest and take pain medicine.  The skin or toes of your foot become numb, swollen, cold, white, or blue.  You have more pain or swelling than you did before the splint was put on.  Your wound is draining fluid or pus.  Blood soaks through your bandage.  Your leg feels warm, tender, and painful. It may look swollen and red.  You suddenly feel lightheaded and short of breath.  You have chest pain when you take a deep breath or cough. You may cough up blood.

## 2024-06-20 NOTE — ED PROVIDER NOTE - PATIENT PORTAL LINK FT
You can access the FollowMyHealth Patient Portal offered by [facility] by registering at the following website: [email or web address]. By joining [employer]’s FollowMyHealth portal, you will also be able to view your health information using other applications (apps) compatible with our system.

## 2024-06-20 NOTE — ED PROVIDER NOTE - WR INTERPRETATION 3
Independent interpretation of the left hip X-Ray film(s) performed by Efrain Blanco are negative for Fractures, dislocations, or subluxations.

## 2024-06-21 ENCOUNTER — APPOINTMENT (OUTPATIENT)
Dept: ORTHOPEDIC SURGERY | Facility: CLINIC | Age: 54
End: 2024-06-21
Payer: COMMERCIAL

## 2024-06-21 DIAGNOSIS — S93.401A SPRAIN OF UNSPECIFIED LIGAMENT OF RIGHT ANKLE, INITIAL ENCOUNTER: ICD-10-CM

## 2024-06-21 DIAGNOSIS — S92.401A DISPLACED UNSPECIFIED FRACTURE OF RIGHT GREAT TOE, INITIAL ENCOUNTER FOR CLOSED FRACTURE: ICD-10-CM

## 2024-06-21 PROBLEM — Z00.00 ENCOUNTER FOR PREVENTIVE HEALTH EXAMINATION: Status: ACTIVE | Noted: 2024-06-21

## 2024-06-21 PROCEDURE — 99203 OFFICE O/P NEW LOW 30 MIN: CPT | Mod: 25

## 2024-06-21 RX ORDER — MELOXICAM 15 MG/1
15 TABLET ORAL
Qty: 30 | Refills: 0 | Status: ACTIVE | COMMUNITY
Start: 2024-06-21 | End: 1900-01-01

## 2024-06-21 NOTE — ED ADULT NURSE NOTE - NSFALLRISKINTERV_ED_ALL_ED

## 2024-06-21 NOTE — DISCUSSION/SUMMARY
[de-identified] : Right foot/ankle pain  HPI Patient is a 53-year-old [place]alander-speaking male accompanied by his son reports to the office for evaluation of his right foot/ankle pain since 6/21/2024.  He states that he accidentally fell from stairs and twisted his right foot/ankle.  He went to [facility] where they performed x-rays and confirmed that he has a great toe fracture.  He was placed in a posterior splint and provided with crutches which he has been using since.  Walking, range of motion, palpating certain areas aggravate the patient's pain.  He has taken Tylenol which has given him minimal relief.  Admits to bruising and swelling around his great toe.  Denies any numbness or tingling.  Right foot/ankle x-rays taken at [facility] were reviewed in office today.  It revealed an intra-articular fracture of the first proximal phalanx.  No ankle fracture noted.  No dislocation.  Otherwise, no other significant abnormalities were seen.  Right foot/ankle exam is as follows: Mild diffuse ankle swelling noted.  Ecchymosis and swelling noted of great toe.  No erythema.  Mild limited range of motion secondary to pain.  TTP over lateral malleolus, lateral ligaments, and first MTP/great toe.  Intact pulse.  Mild limited range of motion secondary to pain.  Light touch intact throughout.  Antalgic gait.  Relation with crutches.  Assessment/plan Explained toe fracture on x-ray.  First and second toes were buddy taped together for support/stability.  He was provided with a tall cam walker boot to protect his ankle as well.  He may weight-bear as tolerated using his crutches if needed.  Mobic 15 mg PO QD PRN was sent to patient's pharmacy to help alleviate their symptoms.  The patient was advised to rest/ice the area and may alternate with warm compresses as needed.  Instructed not to perform any strenuous activity that may worsen symptoms.  Follow-up in 2 to 3 weeks for repeat great toe x-rays and further evaluation.  All questions/concerns were answered in detail.  Entire office visit was conducted with a  via tablet.

## 2024-07-03 ENCOUNTER — APPOINTMENT (OUTPATIENT)
Dept: ORTHOPEDIC SURGERY | Facility: CLINIC | Age: 54
End: 2024-07-03
Payer: COMMERCIAL

## 2024-07-03 DIAGNOSIS — S92.401A DISPLACED UNSPECIFIED FRACTURE OF RIGHT GREAT TOE, INITIAL ENCOUNTER FOR CLOSED FRACTURE: ICD-10-CM

## 2024-07-03 PROCEDURE — 28490 TREAT BIG TOE FRACTURE: CPT | Mod: RT

## 2024-07-03 PROCEDURE — 73660 X-RAY EXAM OF TOE(S): CPT | Mod: RT

## 2024-07-03 PROCEDURE — 99204 OFFICE O/P NEW MOD 45 MIN: CPT | Mod: 57,25

## 2024-07-05 ENCOUNTER — APPOINTMENT (OUTPATIENT)
Dept: ORTHOPEDIC SURGERY | Facility: AMBULATORY SURGERY CENTER | Age: 54
End: 2024-07-05

## 2024-08-02 ENCOUNTER — APPOINTMENT (OUTPATIENT)
Dept: ORTHOPEDIC SURGERY | Facility: CLINIC | Age: 54
End: 2024-08-02
Payer: COMMERCIAL

## 2024-08-02 DIAGNOSIS — S92.401A DISPLACED UNSPECIFIED FRACTURE OF RIGHT GREAT TOE, INITIAL ENCOUNTER FOR CLOSED FRACTURE: ICD-10-CM

## 2024-08-02 PROCEDURE — 99024 POSTOP FOLLOW-UP VISIT: CPT

## 2024-08-02 PROCEDURE — 73660 X-RAY EXAM OF TOE(S): CPT | Mod: RT

## 2024-08-02 NOTE — PHYSICAL EXAM
[de-identified] : The nail is intact.  He is minimally tender at the hallux distal phalanx.  He has near full passive range of motion of the

## 2024-08-02 NOTE — DISCUSSION/SUMMARY
[de-identified] : I discussed the patient's clinical and radiographic findings with him.  At this time I would like the patient to continue with passive range of motion exercises.  He can gradually resume impact activity.  I will see him back on a as needed basis going forward.  All questions sought and answered.

## 2024-08-02 NOTE — DATA REVIEWED
[FreeTextEntry1] : 3 views of the patient's right toe ordered and reviewed by me personally.  X-rays demonstrate evidence of a nondisplaced distal phalanx fracture of the hallux which appears to demonstrate interval healing.  No evidence of posttraumatic arthritis.

## 2024-08-02 NOTE — HISTORY OF PRESENT ILLNESS
[de-identified] : 54-year-old patient here for follow-up of his right hallux fracture.  He is doing well.  Is been working on passive range of motion.  He is wearing a supportive sneaker.  Does not endorse any interval trauma.